# Patient Record
Sex: MALE | URBAN - METROPOLITAN AREA
[De-identification: names, ages, dates, MRNs, and addresses within clinical notes are randomized per-mention and may not be internally consistent; named-entity substitution may affect disease eponyms.]

---

## 2018-06-29 ENCOUNTER — APPOINTMENT (RX ONLY)
Dept: URBAN - METROPOLITAN AREA CLINIC 24 | Facility: CLINIC | Age: 48
Setting detail: DERMATOLOGY
End: 2018-06-29

## 2018-06-29 DIAGNOSIS — L81.0 POSTINFLAMMATORY HYPERPIGMENTATION: ICD-10-CM

## 2018-06-29 DIAGNOSIS — Z86.14 PERSONAL HISTORY OF METHICILLIN RESISTANT STAPHYLOCOCCUS AUREUS INFECTION: ICD-10-CM

## 2018-06-29 DIAGNOSIS — L29.89 OTHER PRURITUS: ICD-10-CM

## 2018-06-29 DIAGNOSIS — L29.8 OTHER PRURITUS: ICD-10-CM

## 2018-06-29 DIAGNOSIS — L40.0 PSORIASIS VULGARIS: ICD-10-CM

## 2018-06-29 PROCEDURE — ? PRESCRIPTION

## 2018-06-29 PROCEDURE — 99203 OFFICE O/P NEW LOW 30 MIN: CPT

## 2018-06-29 PROCEDURE — ? TREATMENT REGIMEN

## 2018-06-29 PROCEDURE — ? COUNSELING

## 2018-06-29 PROCEDURE — ? PATIENT SPECIFIC COUNSELING

## 2018-06-29 RX ORDER — DESONIDE 0.5 MG/G
1 CREAM TOPICAL DAILY
Qty: 1 | Refills: 6 | Status: ERX | COMMUNITY
Start: 2018-06-29

## 2018-06-29 RX ORDER — CALCIPOTRIENE AND BETAMETHASONE DIPROPIONATE 50; .5 UG/G; MG/G
1 AEROSOL, FOAM TOPICAL BID
Qty: 1 | Refills: 6 | Status: ERX | COMMUNITY
Start: 2018-06-29

## 2018-06-29 RX ORDER — CALCIPOTRIENE 50 UG/G
1 CREAM TOPICAL DAILY
Qty: 1 | Refills: 6 | Status: ERX | COMMUNITY
Start: 2018-06-29

## 2018-06-29 RX ORDER — MUPIROCIN 20 MG/G
1 OINTMENT TOPICAL DAILY
Qty: 2 | Refills: 3 | Status: ERX | COMMUNITY
Start: 2018-06-29

## 2018-06-29 RX ADMIN — DESONIDE 1: 0.5 CREAM TOPICAL at 12:35

## 2018-06-29 RX ADMIN — CALCIPOTRIENE 1: 50 CREAM TOPICAL at 12:35

## 2018-06-29 RX ADMIN — CALCIPOTRIENE AND BETAMETHASONE DIPROPIONATE 1: 50; .5 AEROSOL, FOAM TOPICAL at 12:33

## 2018-06-29 RX ADMIN — MUPIROCIN 1: 20 OINTMENT TOPICAL at 12:44

## 2018-06-29 ASSESSMENT — LOCATION DETAILED DESCRIPTION DERM
LOCATION DETAILED: LEFT SUPERIOR OCCIPITAL SCALP
LOCATION DETAILED: LEFT DISTAL PRETIBIAL REGION
LOCATION DETAILED: RIGHT SUPERIOR LATERAL MALAR CHEEK
LOCATION DETAILED: LEFT INFERIOR LATERAL MIDBACK
LOCATION DETAILED: LEFT ANTERIOR PROXIMAL THIGH
LOCATION DETAILED: RIGHT SUPERIOR MEDIAL MIDBACK
LOCATION DETAILED: LEFT PROXIMAL PRETIBIAL REGION
LOCATION DETAILED: LEFT LATERAL DISTAL PRETIBIAL REGION
LOCATION DETAILED: LEFT DISTAL DORSAL FOREARM
LOCATION DETAILED: LEFT DISTAL CALF
LOCATION DETAILED: RIGHT ANTERIOR DISTAL THIGH
LOCATION DETAILED: RIGHT BUTTOCK
LOCATION DETAILED: RIGHT DISTAL CALF
LOCATION DETAILED: RIGHT INFERIOR TEMPLE
LOCATION DETAILED: DORSAL CORONA OF GLANS
LOCATION DETAILED: RIGHT PROXIMAL PRETIBIAL REGION
LOCATION DETAILED: LEFT MEDIAL FRONTAL SCALP
LOCATION DETAILED: RIGHT PROXIMAL DORSAL FOREARM
LOCATION DETAILED: GLUTEAL CLEFT
LOCATION DETAILED: RIGHT DISTAL PRETIBIAL REGION
LOCATION DETAILED: LEFT CENTRAL FRONTAL SCALP
LOCATION DETAILED: RIGHT DISTAL POSTERIOR UPPER ARM
LOCATION DETAILED: INFERIOR LUMBAR SPINE
LOCATION DETAILED: LEFT PROXIMAL DORSAL FOREARM
LOCATION DETAILED: LEFT INGUINAL CREASE
LOCATION DETAILED: RIGHT ANTERIOR PROXIMAL THIGH
LOCATION DETAILED: RIGHT PROXIMAL RADIAL DORSAL FOREARM
LOCATION DETAILED: LEFT DISTAL POSTERIOR UPPER ARM
LOCATION DETAILED: LEFT ANTERIOR DISTAL THIGH
LOCATION DETAILED: SUPRAPUBIC SKIN

## 2018-06-29 ASSESSMENT — LOCATION SIMPLE DESCRIPTION DERM
LOCATION SIMPLE: LEFT SCALP
LOCATION SIMPLE: RIGHT TEMPLE
LOCATION SIMPLE: RIGHT THIGH
LOCATION SIMPLE: POSTERIOR SCALP
LOCATION SIMPLE: RIGHT UPPER ARM
LOCATION SIMPLE: LEFT THIGH
LOCATION SIMPLE: RIGHT CALF
LOCATION SIMPLE: RIGHT BUTTOCK
LOCATION SIMPLE: RIGHT LOWER BACK
LOCATION SIMPLE: RIGHT FOREARM
LOCATION SIMPLE: GLUTEAL CLEFT
LOCATION SIMPLE: LEFT CALF
LOCATION SIMPLE: LEFT PRETIBIAL REGION
LOCATION SIMPLE: GROIN
LOCATION SIMPLE: RIGHT PRETIBIAL REGION
LOCATION SIMPLE: LOWER BACK
LOCATION SIMPLE: LEFT FOREARM
LOCATION SIMPLE: LEFT LOWER BACK
LOCATION SIMPLE: RIGHT CHEEK
LOCATION SIMPLE: LEFT UPPER ARM
LOCATION SIMPLE: PENIS

## 2018-06-29 ASSESSMENT — BSA PSORIASIS: % BODY COVERED IN PSORIASIS: 20

## 2018-06-29 ASSESSMENT — LOCATION ZONE DERM
LOCATION ZONE: SCALP
LOCATION ZONE: LEG
LOCATION ZONE: ARM
LOCATION ZONE: FACE
LOCATION ZONE: PENIS
LOCATION ZONE: TRUNK

## 2018-06-29 ASSESSMENT — PGA PSORIASIS: PGA PSORIASIS: SEVERE (SEVERE PLAQUE ELEVATION, DUSKY TO DEEP ERYTHEMA, VERY THICK TENACIOUS SCALE PREDOMINATES)

## 2018-06-29 NOTE — PROCEDURE: TREATMENT REGIMEN
Action 2: Continue
Initiate Regimen: Enstilar foam daily for scalp and body \\nCalcipotriene cream bid for groin twice daily  \\nDesonide cream twice daily for going and face twice daily for two weeks maximum per month
Continue Regimen: Clobetasol oint bid for two weeks maximum per month until we can get a Enstilar foam covered
Plan: - disc psoriasis is detail with pt \\n\\n- advised patient with psoriasis he can develop psoriatic arthritis \\n- disc immunosuppressive medications VS topical medications VS Otezla \\n- patient has used triamcinolone oint in the past and it has helped but patient states is a very slow progress\\n- patient was also prescribed clobetasol oint by PCP a few weeks ago and patient states he has seen improvement while using the clobetasol but it’s also a slow progress \\n- disc the s/e’s of the topical steroid use \\n- pt does not want to try any immunosuppressive medications at this time \\n- patient states he has extreme fear of needles\\n- disc the itch scratch cycle with pt \\n- recommended bleach baths \\n- patient does not have any HX of liver disease but he does drink alcohol \\n- will give patient samples of the Otezla at today’s visit \\n- advised patient he can cont clobetasol oint \\n- disc Enstilar foam with pt \\n- will prescribe Enstilar foam, calcipotriene cream and desonide cream at today’s visit\\n- patient will follow up in 4 weeks
Detail Level: Simple

## 2018-07-27 ENCOUNTER — APPOINTMENT (RX ONLY)
Dept: URBAN - METROPOLITAN AREA CLINIC 24 | Facility: CLINIC | Age: 48
Setting detail: DERMATOLOGY
End: 2018-07-27

## 2018-07-27 DIAGNOSIS — L40.0 PSORIASIS VULGARIS: ICD-10-CM

## 2018-07-27 DIAGNOSIS — L81.0 POSTINFLAMMATORY HYPERPIGMENTATION: ICD-10-CM

## 2018-07-27 DIAGNOSIS — L29.8 OTHER PRURITUS: ICD-10-CM

## 2018-07-27 DIAGNOSIS — L29.89 OTHER PRURITUS: ICD-10-CM

## 2018-07-27 PROCEDURE — ? TREATMENT REGIMEN

## 2018-07-27 PROCEDURE — 99213 OFFICE O/P EST LOW 20 MIN: CPT

## 2018-07-27 PROCEDURE — ? COUNSELING

## 2018-07-27 ASSESSMENT — LOCATION DETAILED DESCRIPTION DERM
LOCATION DETAILED: RIGHT SUPERIOR MEDIAL MIDBACK
LOCATION DETAILED: LEFT DISTAL DORSAL FOREARM
LOCATION DETAILED: LEFT MEDIAL FRONTAL SCALP
LOCATION DETAILED: RIGHT DISTAL POSTERIOR UPPER ARM
LOCATION DETAILED: RIGHT ANTERIOR DISTAL THIGH
LOCATION DETAILED: GLUTEAL CLEFT
LOCATION DETAILED: DORSAL CORONA OF GLANS
LOCATION DETAILED: LEFT SUPERIOR OCCIPITAL SCALP
LOCATION DETAILED: RIGHT PROXIMAL DORSAL FOREARM
LOCATION DETAILED: LEFT DISTAL CALF
LOCATION DETAILED: RIGHT BUTTOCK
LOCATION DETAILED: RIGHT DISTAL CALF
LOCATION DETAILED: RIGHT ANTERIOR PROXIMAL THIGH
LOCATION DETAILED: LEFT DISTAL PRETIBIAL REGION
LOCATION DETAILED: LEFT PROXIMAL PRETIBIAL REGION
LOCATION DETAILED: LEFT INFERIOR LATERAL MIDBACK
LOCATION DETAILED: LEFT ANTERIOR DISTAL THIGH
LOCATION DETAILED: RIGHT PROXIMAL RADIAL DORSAL FOREARM
LOCATION DETAILED: RIGHT DISTAL PRETIBIAL REGION
LOCATION DETAILED: LEFT ANTERIOR PROXIMAL THIGH
LOCATION DETAILED: LEFT DISTAL POSTERIOR UPPER ARM
LOCATION DETAILED: LEFT CENTRAL FRONTAL SCALP
LOCATION DETAILED: INFERIOR LUMBAR SPINE
LOCATION DETAILED: RIGHT SUPERIOR LATERAL MALAR CHEEK
LOCATION DETAILED: SUPRAPUBIC SKIN
LOCATION DETAILED: LEFT INGUINAL CREASE
LOCATION DETAILED: LEFT PROXIMAL DORSAL FOREARM
LOCATION DETAILED: RIGHT INFERIOR TEMPLE
LOCATION DETAILED: LEFT LATERAL DISTAL PRETIBIAL REGION
LOCATION DETAILED: RIGHT PROXIMAL PRETIBIAL REGION

## 2018-07-27 ASSESSMENT — LOCATION SIMPLE DESCRIPTION DERM
LOCATION SIMPLE: RIGHT CALF
LOCATION SIMPLE: GROIN
LOCATION SIMPLE: LOWER BACK
LOCATION SIMPLE: LEFT SCALP
LOCATION SIMPLE: RIGHT BUTTOCK
LOCATION SIMPLE: LEFT LOWER BACK
LOCATION SIMPLE: RIGHT CHEEK
LOCATION SIMPLE: LEFT UPPER ARM
LOCATION SIMPLE: RIGHT TEMPLE
LOCATION SIMPLE: LEFT FOREARM
LOCATION SIMPLE: LEFT PRETIBIAL REGION
LOCATION SIMPLE: RIGHT LOWER BACK
LOCATION SIMPLE: LEFT CALF
LOCATION SIMPLE: GLUTEAL CLEFT
LOCATION SIMPLE: PENIS
LOCATION SIMPLE: POSTERIOR SCALP
LOCATION SIMPLE: LEFT THIGH
LOCATION SIMPLE: RIGHT UPPER ARM
LOCATION SIMPLE: RIGHT PRETIBIAL REGION
LOCATION SIMPLE: RIGHT FOREARM
LOCATION SIMPLE: RIGHT THIGH

## 2018-07-27 ASSESSMENT — LOCATION ZONE DERM
LOCATION ZONE: ARM
LOCATION ZONE: PENIS
LOCATION ZONE: SCALP
LOCATION ZONE: TRUNK
LOCATION ZONE: LEG
LOCATION ZONE: FACE

## 2018-07-27 NOTE — PROCEDURE: TREATMENT REGIMEN
Action 1: Continue
Plan: - patient states he has seen improvement while on the Otezla \\n- patient reports having mild GI upset and nausea at the start but that has all gone away now\\n- patient is only using the clobetasol oint, Enstilar foam, calcipotriene cream and the desonide cream every once in a while \\n- patient was approved for the Otezla and states he will be we getting his shipment soon \\n- patient will cont his treatment regimen\\n- patient will follow up in 3 months
Detail Level: Simple
Continue Regimen: Clobetasol oint bid for two weeks maximum per month until we can get a Enstilar foam covered\\nEnstilar foam daily for scalp and body \\nCalcipotriene cream bid for groin twice daily  \\nDesonide cream twice daily for going and face twice daily for two weeks maximum per month

## 2018-10-29 ENCOUNTER — APPOINTMENT (RX ONLY)
Dept: URBAN - METROPOLITAN AREA CLINIC 24 | Facility: CLINIC | Age: 48
Setting detail: DERMATOLOGY
End: 2018-10-29

## 2018-10-29 DIAGNOSIS — L81.0 POSTINFLAMMATORY HYPERPIGMENTATION: ICD-10-CM

## 2018-10-29 DIAGNOSIS — L29.89 OTHER PRURITUS: ICD-10-CM

## 2018-10-29 DIAGNOSIS — L40.0 PSORIASIS VULGARIS: ICD-10-CM | Status: IMPROVED

## 2018-10-29 DIAGNOSIS — L29.8 OTHER PRURITUS: ICD-10-CM

## 2018-10-29 PROCEDURE — ? COUNSELING

## 2018-10-29 PROCEDURE — ? TREATMENT REGIMEN

## 2018-10-29 PROCEDURE — 99213 OFFICE O/P EST LOW 20 MIN: CPT

## 2018-10-29 ASSESSMENT — LOCATION DETAILED DESCRIPTION DERM
LOCATION DETAILED: LEFT LATERAL DISTAL PRETIBIAL REGION
LOCATION DETAILED: RIGHT DISTAL PRETIBIAL REGION
LOCATION DETAILED: LEFT SUPERIOR MEDIAL MIDBACK
LOCATION DETAILED: LEFT DISTAL PRETIBIAL REGION
LOCATION DETAILED: RIGHT SUPERIOR UPPER BACK
LOCATION DETAILED: RIGHT DISTAL LATERAL POSTERIOR UPPER ARM
LOCATION DETAILED: RIGHT MEDIAL UPPER BACK
LOCATION DETAILED: RIGHT DISTAL POSTERIOR UPPER ARM
LOCATION DETAILED: INFERIOR LUMBAR SPINE
LOCATION DETAILED: RIGHT POSTERIOR ANKLE

## 2018-10-29 ASSESSMENT — LOCATION SIMPLE DESCRIPTION DERM
LOCATION SIMPLE: LEFT PRETIBIAL REGION
LOCATION SIMPLE: LEFT LOWER BACK
LOCATION SIMPLE: RIGHT UPPER ARM
LOCATION SIMPLE: RIGHT ANKLE
LOCATION SIMPLE: RIGHT PRETIBIAL REGION
LOCATION SIMPLE: RIGHT UPPER BACK
LOCATION SIMPLE: LOWER BACK

## 2018-10-29 ASSESSMENT — PGA PSORIASIS: PGA PSORIASIS: MODERATE (MODERATE PLAQUE ELEVATION, MODERATE ERYTHEMA, COARSE SCALE PREDOMINATES)

## 2018-10-29 ASSESSMENT — LOCATION ZONE DERM
LOCATION ZONE: TRUNK
LOCATION ZONE: ARM
LOCATION ZONE: LEG

## 2018-10-29 ASSESSMENT — BSA PSORIASIS: % BODY COVERED IN PSORIASIS: 15

## 2018-10-29 NOTE — PROCEDURE: COUNSELING
Detail Level: Zone
Quality 410: Psoriasis Clinical Response To Oral Systemic Or Biologic Medications: Documentation that the patient declined therapy change
Quality 337: Tuberculosis Prevention For Psoriasis And Psoriatic Arthritis Patients On A Biological Immune Response Modifier: No documentation of negative or managed positive TB screen

## 2018-10-29 NOTE — PROCEDURE: TREATMENT REGIMEN
Detail Level: Generalized
Plan: Patient states he itches all over his back\\nEncouraged patient to moisturize ongoing with a ceramics cream\\nDry and sensitive skin care product recommendation handout provided today
Plan: Patient has noticed great improvement since starting otezla\\nHe uses the enstilar foam as needed\\nPatient states the psoriasis on his scalp and groin has resolved\\nHas some present on lower legs and back, but overall significant improvement since first visit.\\nContinue current regimen. Encouraged to call clinic for refills if needed\\nRTC in 3-4 months
Continue Regimen: (Body) Enstilar foam: QD PRN \\n(Groin and face) Desonide cream: BID PRN
Action 3: Continue
Detail Level: Simple
Sig For Treatment 1 (If Needed): twice daily
Treatment 1: Otezla 30mg

## 2019-07-02 ENCOUNTER — RX ONLY (OUTPATIENT)
Age: 49
Setting detail: RX ONLY
End: 2019-07-02

## 2019-07-02 RX ORDER — APREMILAST 30 MG/1
1 TABLET, FILM COATED ORAL BID
Qty: 60 | Refills: 0 | Status: ERX | COMMUNITY
Start: 2019-07-02